# Patient Record
(demographics unavailable — no encounter records)

---

## 2024-10-29 NOTE — CONSULT LETTER
[Dear  ___] : Dear  [unfilled], [Consult Letter:] : I had the pleasure of evaluating your patient, [unfilled]. [Please see my note below.] : Please see my note below. [Consult Closing:] : Thank you very much for allowing me to participate in the care of this patient.  If you have any questions, please do not hesitate to contact me. [Sincerely,] : Sincerely, [FreeTextEntry3] : Alexandra Royal MD Department of Dermatology  [facility]

## 2024-10-29 NOTE — ASSESSMENT
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 We have discussed the nature and course of this condition. PLAN  -- recommend liberal use of plain Vaseline daily -- Can start HCT 2.5% ointment BID PRN roughness. Side effects discussed with pt including but not limited to thinning of the skin, atrophy and dyspigmentation.  #Nevus simplex Discussed clinical diagnosis given presenting symptoms and presentation. We have discussed the nature and course of this condition. Although favoring, discussed that possible capillary malformation, thus, in this case would recommend optho eval in consideration of Sturge-Lucila to rule this out. Will place referral to peds optho today  # Xerosis cutis - Dry skincare reviewed, handout provided Dry skin care reviewed:   - Take short showers/baths (avoid hot water)  - Use a mild soap (eg. CeraVe cleanser or Aquaphor)  - Use soap only on areas truly needed (underarms,groin,buttocks,fold areas, feet, face, hair)  - Pat off excess water and put moisturizer on immediately (within 3 min.)              Good moisturizing choices include:                       1. Cetaphil cream (not baby Cetaphil)                       2. CeraVe cream                       3. Vanicream cream                       4. Aquaphor ointment                       5. Vaseline ointment                       6. CeraVe ointment  - A moisturizer should always be applied after showering or bathing, but may be applied as many additional times as is necessary.    RTC PRN

## 2024-10-29 NOTE — HISTORY OF PRESENT ILLNESS
[FreeTextEntry1] : npv; rash on eye [de-identified] : LION DAVIS is a pleasant 9 month F who presents for the following:  #Rash on left eye present since around 6 months that improved, had used topical erythromycin ointment  M: Aquaphor S: Massimo D: ALL Free & Clear _________________  SH: Presents with mom

## 2024-10-29 NOTE — HISTORY OF PRESENT ILLNESS
[FreeTextEntry1] : npv; rash on eye [de-identified] : LION DAVIS is a pleasant 9 month F who presents for the following:  #Rash on left eye present since around 6 months that improved, had used topical erythromycin ointment  M: Aquaphor S: Massimo D: ALL Free & Clear _________________  SH: Presents with mom

## 2024-10-29 NOTE — CONSULT LETTER
[Dear  ___] : Dear  [unfilled], [Consult Letter:] : I had the pleasure of evaluating your patient, [unfilled]. [Please see my note below.] : Please see my note below. [Consult Closing:] : Thank you very much for allowing me to participate in the care of this patient.  If you have any questions, please do not hesitate to contact me. [Sincerely,] : Sincerely, [FreeTextEntry3] : Alexandra Royal MD Department of Dermatology  [facility]

## 2024-10-29 NOTE — PHYSICAL EXAM
[FreeTextEntry3] : left upper eyelid with erythema and scaling.  right eyelid, medial forehead, posterior scalp with red, pink patches mild xerosis of skin